# Patient Record
Sex: FEMALE | Race: WHITE | NOT HISPANIC OR LATINO
[De-identification: names, ages, dates, MRNs, and addresses within clinical notes are randomized per-mention and may not be internally consistent; named-entity substitution may affect disease eponyms.]

---

## 2019-03-27 ENCOUNTER — TRANSCRIPTION ENCOUNTER (OUTPATIENT)
Age: 54
End: 2019-03-27

## 2019-03-29 PROBLEM — Z00.00 ENCOUNTER FOR PREVENTIVE HEALTH EXAMINATION: Status: ACTIVE | Noted: 2019-03-29

## 2019-04-03 ENCOUNTER — APPOINTMENT (OUTPATIENT)
Dept: ORTHOPEDIC SURGERY | Facility: CLINIC | Age: 54
End: 2019-04-03
Payer: OTHER MISCELLANEOUS

## 2019-04-03 VITALS
DIASTOLIC BLOOD PRESSURE: 69 MMHG | WEIGHT: 160 LBS | HEIGHT: 67 IN | BODY MASS INDEX: 25.11 KG/M2 | HEART RATE: 72 BPM | SYSTOLIC BLOOD PRESSURE: 101 MMHG

## 2019-04-03 DIAGNOSIS — Z80.3 FAMILY HISTORY OF MALIGNANT NEOPLASM OF BREAST: ICD-10-CM

## 2019-04-03 DIAGNOSIS — M72.2 PLANTAR FASCIAL FIBROMATOSIS: ICD-10-CM

## 2019-04-03 DIAGNOSIS — Z78.9 OTHER SPECIFIED HEALTH STATUS: ICD-10-CM

## 2019-04-03 DIAGNOSIS — S92.352A DISPLACED FRACTURE OF FIFTH METATARSAL BONE, LEFT FOOT, INITIAL ENCOUNTER FOR CLOSED FRACTURE: ICD-10-CM

## 2019-04-03 DIAGNOSIS — Z72.3 LACK OF PHYSICAL EXERCISE: ICD-10-CM

## 2019-04-03 PROCEDURE — 97760 ORTHOTIC MGMT&TRAING 1ST ENC: CPT

## 2019-04-03 PROCEDURE — 99204 OFFICE O/P NEW MOD 45 MIN: CPT | Mod: 25

## 2019-04-03 RX ORDER — THYROID 90 MG/1
TABLET ORAL
Refills: 0 | Status: ACTIVE | COMMUNITY

## 2019-04-03 NOTE — ADDENDUM
[FreeTextEntry1] : Documented by Joanne Osborn acting as a scribe for Dr. Gleason on 04/03/2019 \par \par All medical record entries made by the Scribe were at my, Dr. Costa, direction and\par personally dictated by me on 04/03/2019 . I have reviewed the chart and agree that the record\par accurately reflects my personal performance of the history, physical exam, procedure and imaging.

## 2019-04-03 NOTE — CONSULT LETTER
[Consult Letter:] : I had the pleasure of evaluating your patient, [unfilled]. [Sincerely,] : Sincerely, [FreeTextEntry2] : none  [FreeTextEntry3] : Darnell Gleason

## 2019-04-03 NOTE — HISTORY OF PRESENT ILLNESS
[FreeTextEntry1] : Pt is a 54 year old  F present in the office today in regards to her L ankle/foot pain. Pt notes she was coming [place] of [place] and heard a "crack"  on her L foot on 3/26/19. She notes she went to urgent care the next day and was given a PO shoe. She notes this is a work related injury.  Pt reports her pain is constant in nature. She reports her  pain is exacerbated with walking. Her current pain level is a 5-7/10. She is not currently taking any pain medications at this moment. No other complaints at this time.

## 2019-04-03 NOTE — DISCUSSION/SUMMARY
[de-identified] : Today in the office I had a lengthy discussion with the patient regarding her L foot/ankle pain. I have addressed all of the patient's concern surrounding the pathology of their conditions. I have fitted the patient with a CAM boot today. The patient can weight bear as tolerated. I advised the patient to utilize ice, NSAIDs PRN, and elevate her L foot/ankle above the level of their heart. The pt is to call me as soon as possible if they notice any worsening pain or symptoms. I would like to follow up with the patient within the next 2 weeks. All questions were answered and the patient verbalized understanding. The patient is in agreement with this treatment plan.

## 2019-04-03 NOTE — PHYSICAL EXAM
[de-identified] : General: Alert and oriented x3. In no acute distress. Pleasant in nature with a normal affect. No apparent respiratory distress.\par \par L Foot/Ankle Exam\par Skin: Clean, dry and intact.\par Inspection: No obvious malalignment, no swelling, no effusion. No lymphadenopathy. \par Pulses: 2+ DP/PT pulses\par ROM: Foot:   Full ROM of digits. Ankle: 10 degrees dorsiflexion,  40  degrees of plantarflexion, 10   degrees of subtalar motion.\par Tenderness: + pain lateral peroneals. + pain proximal 5th.  No tenderness over the lateral malleolus, no CFL/ATFL/PTFL pain. No medial malleolus pain, no deltoid ligament pain.  No heel pain. No Achilles tenderness. No popping or clicking. No pain to the LisFranc joint. No ttp over the posterior tibial tendon. \par Painful ROM: None\par Stability: Negative anterior/posterior drawer. \par Strength: 5/5 ADD/ABD/TA/GS/EHL/FHL/EDL\par Neuro: Intact to light touch throughout\par Additional tests: Negative Millan's test, negative tarsal tunnel tinel's, negative single heel rise.  [de-identified] : 3V of  L foot and ankle from outside facility revealed ossicle adjacent to the cuboid noted and Ossicle adjacent to the lateral mal.  No acute fx.

## 2019-04-17 ENCOUNTER — APPOINTMENT (OUTPATIENT)
Dept: ORTHOPEDIC SURGERY | Facility: CLINIC | Age: 54
End: 2019-04-17
Payer: OTHER MISCELLANEOUS

## 2019-04-17 DIAGNOSIS — M79.672 PAIN IN LEFT FOOT: ICD-10-CM

## 2019-04-17 PROCEDURE — 99213 OFFICE O/P EST LOW 20 MIN: CPT

## 2019-04-17 NOTE — HISTORY OF PRESENT ILLNESS
[FreeTextEntry1] : Pt is a 54 year old  F present in the office today in regards to her L ankle/foot pain. Her current pain level is a 5/10. Pt states she has improved in pain and swelling as compared to their last visit. She does note she still has pain to the arch and to the plantar aspect of the L foot. She is not currently taking any pain medications at this moment. Pt is present at the appointment wearing a CAM boot. No other complaints at this time.

## 2019-04-17 NOTE — ADDENDUM
[FreeTextEntry1] : Documented by Joanne Osborn acting as a scribe for Dr. Gleason on 04/17/2019 \par \par All medical record entries made by the Scribe were at my, Dr. Costa, direction and\par personally dictated by me on 04/17/2019 . I have reviewed the chart and agree that the record\par accurately reflects my personal performance of the history, physical exam, procedure and imaging.

## 2019-04-17 NOTE — DISCUSSION/SUMMARY
[de-identified] : Today in the office I had a lengthy discussion with the patient regarding her l foot/ankle pain. I have addressed all of the patient's concern surrounding the pathology of their conditions.  Over the next 2 weeks patient will begin weaning out of their CAM boot and into the ASO brace which I have given her today, then eventually into regular shoe wear. Pt is to progress slowly with activities as tolerated. I also recommended the patient undergo a course of physical therapy for 2-3 times a weeks for a total of 12 weeks. Therefore a physical therapy script was provided.  I suggested to the patient that she  work with her  ROM, strengthening, home exercises, and stretching. I advised the patient to utilize ice, NSAIDs PRN, and elevate her  LLE above the level of their heart. I have also prescribed the patient 50,000 units of Vitamin D which they are to take once a week for a total of 6 weeks. The pt is to call me as soon as possible if they notice any worsening pain or symptoms. I would like to follow up with the patient within the next 6-8 weeks. All questions were answered and the patient verbalized understanding. The patient is in agreement with this treatment plan.

## 2019-04-17 NOTE — PHYSICAL EXAM
[de-identified] : General: Alert and oriented x3. In no acute distress. Pleasant in nature with a normal affect. No apparent respiratory distress.\par \par L Foot/Ankle Exam\par Skin: Clean, dry and intact.\par Inspection: No obvious malalignment, no swelling, no effusion. No lymphadenopathy. \par Pulses: 2+ DP/PT pulses\par ROM: Foot:   Full ROM of digits. Ankle: 10 degrees dorsiflexion,  40  degrees of plantarflexion, 10   degrees of subtalar motion.\par Tenderness: + pain peroneal  peroneals. Improved pain proximal 5th.  No tenderness over the lateral malleolus, no CFL/ATFL/PTFL pain. No medial malleolus pain, no deltoid ligament pain.  No heel pain. No Achilles tenderness. No popping or clicking. No pain to the LisFranc joint. No ttp over the posterior tibial tendon. \par Painful ROM: None\par Stability: Negative anterior/posterior drawer. \par Strength: 5/5 ADD/ABD/TA/GS/EHL/FHL/EDL\par Neuro: Intact to light touch throughout\par Additional tests: Negative Millan's test, negative tarsal tunnel tinel's, negative single heel rise.  [de-identified] : None new obtained today.

## 2019-06-06 ENCOUNTER — APPOINTMENT (OUTPATIENT)
Age: 54
End: 2019-06-06
Payer: OTHER MISCELLANEOUS

## 2019-06-13 ENCOUNTER — APPOINTMENT (OUTPATIENT)
Age: 54
End: 2019-06-13
Payer: OTHER MISCELLANEOUS

## 2019-06-13 DIAGNOSIS — S92.352D DISPLACED FRACTURE OF FIFTH METATARSAL BONE, LEFT FOOT, SUBSEQUENT ENCOUNTER FOR FRACTURE WITH ROUTINE HEALING: ICD-10-CM

## 2019-06-13 DIAGNOSIS — M25.572 PAIN IN LEFT ANKLE AND JOINTS OF LEFT FOOT: ICD-10-CM

## 2019-06-13 PROCEDURE — 99213 OFFICE O/P EST LOW 20 MIN: CPT

## 2019-06-13 PROCEDURE — 73630 X-RAY EXAM OF FOOT: CPT | Mod: LT

## 2019-06-13 NOTE — DISCUSSION/SUMMARY
[de-identified] : Today I had a lengthy discussion with the patient regarding their left foot/ankle pain.I have addressed all the patient's concerns surrounding the pathology of their condition. A discussion was had about formal PT course, but the patient deferred at this time. I recommend that the patient take 50,000 units of Vitamin D once per week. A prescription for the vitamin D was provided for the patient in the office today. A discussion was had about the patient's return to normal activities. The patient states they currently have little to no pain. The patient is clear to return to normal activities as tolerated. I would like to see the patient back in the office PRN.  The patient understood and verbally agreed to the treatment plan. All of their questions were answered and they were satisfied with the visit. The patient should call the office if they have any questions or experience worsening symptoms.

## 2019-06-13 NOTE — PHYSICAL EXAM
[de-identified] : General: Alert and oriented x3. In no acute distress. Pleasant in nature with a normal affect. No apparent respiratory distress.\par \par L Foot/Ankle Exam\par Skin: Clean, dry and intact.\par Inspection: No obvious malalignment, no swelling, no effusion. No lymphadenopathy. \par Pulses: 2+ DP/PT pulses\par ROM: Foot:   Full ROM of digits. Ankle: 10 degrees dorsiflexion,  40  degrees of plantarflexion, 10   degrees of subtalar motion.\par Tenderness: Improved pain peroneal  peroneals. Improved pain proximal 5th.  No tenderness over the lateral malleolus, no CFL/ATFL/PTFL pain. No medial malleolus pain, no deltoid ligament pain.  No heel pain. No Achilles tenderness. No popping or clicking. No pain to the LisFranc joint. No ttp over the posterior tibial tendon. \par Painful ROM: None\par Stability: Negative anterior/posterior drawer. \par Strength: 5/5 ADD/ABD/TA/GS/EHL/FHL/EDL\par Neuro: Intact to light touch throughout\par Additional tests: Negative Millan's test, negative tarsal tunnel tinel's, negative single heel rise.  [de-identified] : 3V of the L foot were ordered obtained and reviewed by me today, 06/13/2019 , revealed:  No fx. No Abnormalities to the 5th MT. \par \par \par \par \par \par

## 2019-06-13 NOTE — HISTORY OF PRESENT ILLNESS
[FreeTextEntry1] : 54 year year old female presenting with left foot/ankle pain. The patient’s pain is noted to be a 4/10. The patient describes their pain as improved compared to their last visit. The patient states that she has mild continued stiffness in the side of the foot. She presents wearing regular shoes. She is currently taking no pain medication. No other complaints at this time.

## 2019-06-13 NOTE — ADDENDUM
[FreeTextEntry1] : I, Ryan Santizo, acted solely as a scribe for Dr. Darnell Gleason on this date 06/13/2019  .\par  \par All medical record entries made by the Scribe were at my, Dr. Darnell Gleason, direction and personally dictated by me on 06/13/2019 . I have reviewed the chart and agree that the record accurately reflects my personal performance of the history, physical exam, assessment and plan. I have also personally directed, reviewed, and agreed with the chart.\par \par \par

## 2019-10-04 ENCOUNTER — RX RENEWAL (OUTPATIENT)
Age: 54
End: 2019-10-04

## 2019-10-04 RX ORDER — CHOLECALCIFEROL (VITAMIN D3) 1250 MCG
1.25 MG CAPSULE ORAL
Qty: 4 | Refills: 4 | Status: ACTIVE | COMMUNITY
Start: 2019-04-17 | End: 1900-01-01

## 2023-10-09 ENCOUNTER — APPOINTMENT (OUTPATIENT)
Dept: ORTHOPEDIC SURGERY | Facility: CLINIC | Age: 58
End: 2023-10-09
Payer: COMMERCIAL

## 2023-10-09 PROCEDURE — 99203 OFFICE O/P NEW LOW 30 MIN: CPT | Mod: 25

## 2023-10-09 PROCEDURE — 73030 X-RAY EXAM OF SHOULDER: CPT | Mod: LT

## 2023-10-14 ENCOUNTER — APPOINTMENT (OUTPATIENT)
Dept: MRI IMAGING | Facility: CLINIC | Age: 58
End: 2023-10-14
Payer: COMMERCIAL

## 2023-10-14 PROCEDURE — 73221 MRI JOINT UPR EXTREM W/O DYE: CPT | Mod: LT

## 2023-10-20 ENCOUNTER — TRANSCRIPTION ENCOUNTER (OUTPATIENT)
Age: 58
End: 2023-10-20

## 2023-10-30 ENCOUNTER — APPOINTMENT (OUTPATIENT)
Dept: ORTHOPEDIC SURGERY | Facility: CLINIC | Age: 58
End: 2023-10-30
Payer: COMMERCIAL

## 2023-10-30 PROCEDURE — 99213 OFFICE O/P EST LOW 20 MIN: CPT

## 2023-10-30 RX ORDER — METHYLPREDNISOLONE 4 MG/1
4 TABLET ORAL
Qty: 1 | Refills: 0 | Status: ACTIVE | COMMUNITY
Start: 2023-10-30 | End: 1900-01-01

## 2023-11-17 ENCOUNTER — APPOINTMENT (OUTPATIENT)
Dept: ORTHOPEDIC SURGERY | Facility: CLINIC | Age: 58
End: 2023-11-17
Payer: COMMERCIAL

## 2023-11-17 DIAGNOSIS — M75.02 ADHESIVE CAPSULITIS OF LEFT SHOULDER: ICD-10-CM

## 2023-11-17 DIAGNOSIS — S46.012A STRAIN OF MUSCLE(S) AND TENDON(S) OF THE ROTATOR CUFF OF LEFT SHOULDER, INITIAL ENCOUNTER: ICD-10-CM

## 2023-11-17 DIAGNOSIS — M25.512 PAIN IN LEFT SHOULDER: ICD-10-CM

## 2023-11-17 PROCEDURE — 20611 DRAIN/INJ JOINT/BURSA W/US: CPT | Mod: LT

## 2023-11-17 PROCEDURE — 99213 OFFICE O/P EST LOW 20 MIN: CPT | Mod: 25
